# Patient Record
Sex: MALE | Race: ASIAN | NOT HISPANIC OR LATINO | URBAN - METROPOLITAN AREA
[De-identification: names, ages, dates, MRNs, and addresses within clinical notes are randomized per-mention and may not be internally consistent; named-entity substitution may affect disease eponyms.]

---

## 2017-06-16 ENCOUNTER — OFFICE VISIT - HEALTHEAST (OUTPATIENT)
Dept: FAMILY MEDICINE | Facility: CLINIC | Age: 22
End: 2017-06-16

## 2017-06-16 DIAGNOSIS — Z28.39 IMMUNIZATION DEFICIENCY: ICD-10-CM

## 2017-06-16 DIAGNOSIS — Z11.3 SCREENING FOR STD (SEXUALLY TRANSMITTED DISEASE): ICD-10-CM

## 2017-06-16 DIAGNOSIS — R21 RASH: ICD-10-CM

## 2017-06-16 DIAGNOSIS — L29.9 PRURITUS: ICD-10-CM

## 2017-06-16 ASSESSMENT — MIFFLIN-ST. JEOR: SCORE: 1553.35

## 2017-06-19 LAB — SYPHILIS RPR SCREEN - HISTORICAL: NORMAL

## 2017-07-19 ENCOUNTER — OFFICE VISIT - HEALTHEAST (OUTPATIENT)
Dept: FAMILY MEDICINE | Facility: CLINIC | Age: 22
End: 2017-07-19

## 2017-07-19 DIAGNOSIS — H53.8 BLURRED VISION, BILATERAL: ICD-10-CM

## 2017-07-19 DIAGNOSIS — L29.9 PRURITUS: ICD-10-CM

## 2017-07-19 DIAGNOSIS — R21 RASH: ICD-10-CM

## 2017-07-19 DIAGNOSIS — Z23 IMMUNIZATION DUE: ICD-10-CM

## 2017-07-19 ASSESSMENT — MIFFLIN-ST. JEOR: SCORE: 1535.21

## 2017-08-30 ENCOUNTER — OFFICE VISIT - HEALTHEAST (OUTPATIENT)
Dept: FAMILY MEDICINE | Facility: CLINIC | Age: 22
End: 2017-08-30

## 2017-08-30 DIAGNOSIS — Z91.018 FOOD ALLERGY: ICD-10-CM

## 2017-08-30 DIAGNOSIS — Z00.00 ROUTINE GENERAL MEDICAL EXAMINATION AT A HEALTH CARE FACILITY: ICD-10-CM

## 2017-08-30 DIAGNOSIS — L29.9 PRURITUS: ICD-10-CM

## 2017-08-30 RX ORDER — LORATADINE 10 MG/1
10 TABLET ORAL DAILY
Qty: 30 TABLET | Refills: 11 | Status: SHIPPED | OUTPATIENT
Start: 2017-08-30

## 2017-08-30 ASSESSMENT — MIFFLIN-ST. JEOR: SCORE: 1527.88

## 2017-08-31 ENCOUNTER — COMMUNICATION - HEALTHEAST (OUTPATIENT)
Dept: FAMILY MEDICINE | Facility: CLINIC | Age: 22
End: 2017-08-31

## 2017-08-31 ENCOUNTER — AMBULATORY - HEALTHEAST (OUTPATIENT)
Dept: FAMILY MEDICINE | Facility: CLINIC | Age: 22
End: 2017-08-31

## 2017-09-05 ENCOUNTER — OFFICE VISIT - HEALTHEAST (OUTPATIENT)
Dept: ALLERGY | Facility: CLINIC | Age: 22
End: 2017-09-05

## 2017-09-05 DIAGNOSIS — Z91.018 FOOD ALLERGY: ICD-10-CM

## 2017-09-05 DIAGNOSIS — L30.9 ECZEMA: ICD-10-CM

## 2017-09-05 RX ORDER — TRIAMCINOLONE ACETONIDE 1 MG/G
OINTMENT TOPICAL 2 TIMES DAILY
Qty: 453.6 G | Refills: 1 | Status: SHIPPED | OUTPATIENT
Start: 2017-09-05

## 2017-09-05 ASSESSMENT — MIFFLIN-ST. JEOR: SCORE: 1535.21

## 2017-09-08 LAB — TOTAL IGE - HISTORICAL: 727 KU/L (ref 0–100)

## 2017-09-12 ENCOUNTER — COMMUNICATION - HEALTHEAST (OUTPATIENT)
Dept: ALLERGY | Facility: CLINIC | Age: 22
End: 2017-09-12

## 2017-09-26 ENCOUNTER — OFFICE VISIT - HEALTHEAST (OUTPATIENT)
Dept: ALLERGY | Facility: CLINIC | Age: 22
End: 2017-09-26

## 2019-02-28 ENCOUNTER — COMMUNICATION - HEALTHEAST (OUTPATIENT)
Dept: SCHEDULING | Facility: CLINIC | Age: 24
End: 2019-02-28

## 2019-02-28 ENCOUNTER — OFFICE VISIT - HEALTHEAST (OUTPATIENT)
Dept: FAMILY MEDICINE | Facility: CLINIC | Age: 24
End: 2019-02-28

## 2019-02-28 DIAGNOSIS — K40.90 UNILATERAL INGUINAL HERNIA WITHOUT OBSTRUCTION OR GANGRENE, RECURRENCE NOT SPECIFIED: ICD-10-CM

## 2019-02-28 ASSESSMENT — MIFFLIN-ST. JEOR: SCORE: 1547.91

## 2019-03-04 ENCOUNTER — OFFICE VISIT - HEALTHEAST (OUTPATIENT)
Dept: SURGERY | Facility: CLINIC | Age: 24
End: 2019-03-04

## 2019-03-04 DIAGNOSIS — K40.90 NON-RECURRENT UNILATERAL INGUINAL HERNIA WITHOUT OBSTRUCTION OR GANGRENE: ICD-10-CM

## 2019-03-04 ASSESSMENT — MIFFLIN-ST. JEOR: SCORE: 1547

## 2019-03-07 ENCOUNTER — COMMUNICATION - HEALTHEAST (OUTPATIENT)
Dept: SURGERY | Facility: CLINIC | Age: 24
End: 2019-03-07

## 2019-03-26 ENCOUNTER — ANESTHESIA - HEALTHEAST (OUTPATIENT)
Dept: SURGERY | Facility: AMBULATORY SURGERY CENTER | Age: 24
End: 2019-03-26

## 2019-03-26 ENCOUNTER — OFFICE VISIT - HEALTHEAST (OUTPATIENT)
Dept: FAMILY MEDICINE | Facility: CLINIC | Age: 24
End: 2019-03-26

## 2019-03-26 DIAGNOSIS — Z01.818 PREOPERATIVE EXAMINATION: ICD-10-CM

## 2019-03-26 DIAGNOSIS — K40.91 UNILATERAL RECURRENT INGUINAL HERNIA WITHOUT OBSTRUCTION OR GANGRENE: ICD-10-CM

## 2019-03-26 DIAGNOSIS — L29.9 PRURITUS: ICD-10-CM

## 2019-03-26 LAB
ANION GAP SERPL CALCULATED.3IONS-SCNC: 11 MMOL/L (ref 5–18)
BUN SERPL-MCNC: 7 MG/DL (ref 8–22)
CALCIUM SERPL-MCNC: 9.8 MG/DL (ref 8.5–10.5)
CHLORIDE BLD-SCNC: 105 MMOL/L (ref 98–107)
CO2 SERPL-SCNC: 26 MMOL/L (ref 22–31)
CREAT SERPL-MCNC: 0.71 MG/DL (ref 0.7–1.3)
GFR SERPL CREATININE-BSD FRML MDRD: >60 ML/MIN/1.73M2
GLUCOSE BLD-MCNC: 100 MG/DL (ref 70–125)
HGB BLD-MCNC: 14.6 G/DL (ref 14–18)
POTASSIUM BLD-SCNC: 3.8 MMOL/L (ref 3.5–5)
SODIUM SERPL-SCNC: 142 MMOL/L (ref 136–145)

## 2019-03-26 ASSESSMENT — MIFFLIN-ST. JEOR: SCORE: 1570.93

## 2019-03-27 ENCOUNTER — SURGERY - HEALTHEAST (OUTPATIENT)
Dept: SURGERY | Facility: AMBULATORY SURGERY CENTER | Age: 24
End: 2019-03-27

## 2019-03-27 ENCOUNTER — HOSPITAL ENCOUNTER (OUTPATIENT)
Dept: SURGERY | Facility: AMBULATORY SURGERY CENTER | Age: 24
Discharge: HOME | End: 2019-03-27
Attending: SURGERY | Admitting: SURGERY

## 2019-03-27 DIAGNOSIS — K40.91 UNILATERAL RECURRENT INGUINAL HERNIA WITHOUT OBSTRUCTION OR GANGRENE: ICD-10-CM

## 2019-03-27 RX ORDER — TRAMADOL HYDROCHLORIDE 50 MG/1
50 TABLET ORAL EVERY 6 HOURS PRN
Qty: 12 TABLET | Refills: 0 | Status: SHIPPED | OUTPATIENT
Start: 2019-03-27

## 2019-03-27 ASSESSMENT — MIFFLIN-ST. JEOR
SCORE: 1570.93
SCORE: 1570.93

## 2019-04-09 ENCOUNTER — OFFICE VISIT - HEALTHEAST (OUTPATIENT)
Dept: SURGERY | Facility: CLINIC | Age: 24
End: 2019-04-09

## 2019-04-09 DIAGNOSIS — Z98.890 POST-OPERATIVE STATE: ICD-10-CM

## 2021-05-27 NOTE — OP NOTE
Name:  Maynor Jett  PCP:  Juan Aguero MD  Procedure Date:  3/27/2019      REPAIR, HERNIA, INGUINAL, LAPAROSCOPIC (Left)    Pre-Procedure Diagnosis:  Inguinal hernia [K40.90]     Post-Procedure Diagnosis:    Inguinal hernia [K40.90]    Surgeon(s):  Triston Carrizales MD    Anesthesia Type:  GET      Findings:  Left indirect inguinal hernia    Operative Report:    The patient was brought to the operating room where after induction of general anesthesia with endotracheal intubation was positioned with both arms tucked.  Was then prepped and draped in standard sterile fashion    After procedural pause is performed we began by making a curvilinear incision inferior to the umbilicus after injection of local anesthetic.  Using blunt dissection with cautery dissected down to the anterior abdominal wall fascia.  The linea alba was identified.  We then made a transverse incision in the anterior fascia of the rectus sheath just to the right of the linea alba.  The rectus muscle was then bluntly swept laterally and the preperitoneal space bluntly dissected free with a finger.  A 10 mm trocar was then inserted and the preperitoneal space was insufflated.  Two 5 mm trochars and placed the first immediately superior to the pubis and the second 5 cm further cephalad.    We began our dissection on the left side.  Using blunt dissection the peritoneum was dissected away from the anterior abdominal wall.  This dissection was carried lateral until we were cephalad and lateral to the anterior superior iliac spine.  Proceeding distal to the internal inguinal ring the hernia sac was identified.  Using blunt dissection the hernia sac was dissected free from the vas deferens and spermatic vessels.  1 tears were made in the peritoneum which was closed with a clip.  The peritoneum was dissected further posteriorly until we were able to visualize the lateral aspect of the psoas muscle, and dissected medially until we were able to visualize  Ramon's ligament.     A piece of 12 x 15 cm Prolene mesh was then selected.  It was then folded and inserted into the preperitoneal space through the 10 mm port.  Once in the preperitoneal space was unfurled against the anterior abdominal wall well centered over the hernia defect.  There were no wrinkles or concerns for mesh folding.  The medial aspect of the mesh overlay Ramon's ligament.     Satisfied with our mesh mesh placement and then proceeded to desufflate the preperitoneal space under direct visualization.  The ports and incisions were then removed.  The remainder of the local anesthetic was then injected in the skin of the fashion described in the port sites.  The fascial defect was closed with a figure-of-eight interrupted 0 Vicryl suture and the skin closed with running 4-0 Vicryl subcuticular suture.        Estimated Blood Loss:   * No blood loss documented between In Room and Out of Room log events - 3/27/2019 11:33 AM to 3/27/2019 12:21 PM *    Specimens:    None       Complications:    None    Triston Carrizales

## 2021-05-27 NOTE — ANESTHESIA PREPROCEDURE EVALUATION
Anesthesia Evaluation      Patient summary reviewed   No history of anesthetic complications     Airway   Mallampati: I  Neck ROM: full   Pulmonary - negative ROS and normal exam                          Cardiovascular - negative ROS and normal exam   Neuro/Psych - negative ROS     Endo/Other - negative ROS      GI/Hepatic/Renal - negative ROS           Dental - normal exam                        Anesthesia Plan  Planned anesthetic: general endotracheal    ASA 1   Induction: intravenous   Anesthetic plan and risks discussed with: patient, spouse and  services used  Anesthesia plan special considerations: antiemetics,   Post-op plan: routine recovery      Results for orders placed or performed in visit on 03/26/19   Basic Metabolic Panel   Result Value Ref Range    Sodium 142 136 - 145 mmol/L    Potassium 3.8 3.5 - 5.0 mmol/L    Chloride 105 98 - 107 mmol/L    CO2 26 22 - 31 mmol/L    Anion Gap, Calculation 11 5 - 18 mmol/L    Glucose 100 70 - 125 mg/dL    Calcium 9.8 8.5 - 10.5 mg/dL    BUN 7 (L) 8 - 22 mg/dL    Creatinine 0.71 0.70 - 1.30 mg/dL    GFR MDRD Af Amer >60 >60 mL/min/1.73m2    GFR MDRD Non Af Amer >60 >60 mL/min/1.73m2   Hemoglobin   Result Value Ref Range    Hemoglobin 14.6 14.0 - 18.0 g/dL

## 2021-05-27 NOTE — ANESTHESIA CARE TRANSFER NOTE
Last vitals:   Vitals:    03/27/19 1224   BP: 116/59   Pulse: 88   Resp: 16   Temp: 36.9  C (98.4  F)   SpO2: 100%     Patient spontaneous RR, TV 400s, suctioned, following commands, extubated to facemask 10LPM, O2 sats 100%. VSS. Report to RN.    Patient's level of consciousness is drowsy  Spontaneous respirations: yes  Maintains airway independently: yes  Dentition unchanged: yes  Oropharynx: oropharynx clear of all foreign objects    QCDR Measures:  ASA# 20 - Surgical Safety Checklist: WHO surgical safety checklist completed prior to induction    PQRS# 430 - Adult PONV Prevention: 4558F - Pt received => 2 anti-emetic agents (different classes) preop & intraop  ASA# 8 - Peds PONV Prevention: NA - Not pediatric patient, not GA or 2 or more risk factors NOT present  PQRS# 424 - Rosalba-op Temp Management: 4559F - At least one body temp DOCUMENTED => 35.5C or 95.9F within required timeframe  PQRS# 426 - PACU Transfer Protocol: - Transfer of care checklist used  ASA# 14 - Acute Post-op Pain: ASA14B - Patient did NOT experience pain >= 7 out of 10

## 2021-05-27 NOTE — ANESTHESIA POSTPROCEDURE EVALUATION
Patient: [facility] Jett  REPAIR, HERNIA, INGUINAL, LAPAROSCOPIC  Anesthesia type: general    Patient location: Phase II Recovery  Last vitals:   Vitals:    03/27/19 1300   BP: 117/66   Pulse: 81   Resp: 16   Temp: 37.1  C (98.8  F)   SpO2: 100%     Post vital signs: stable  Level of consciousness: awake and responds to simple questions  Post-anesthesia pain: pain controlled  Post-anesthesia nausea and vomiting: no  Pulmonary: unassisted, return to baseline  Cardiovascular: stable and blood pressure at baseline  Hydration: adequate  Anesthetic events: no    QCDR Measures:  ASA# 11 - Rosalba-op Cardiac Arrest: ASA11B - Patient did NOT experience unanticipated cardiac arrest  ASA# 12 - [place]alba-op Mortality Rate: ASA12B - Patient did NOT die  ASA# 13 - PACU Re-Intubation Rate: ASA13B - Patient did NOT require a new airway mgmt  ASA# 10 - Composite Anes Safety: ASA10A - No serious adverse event    Additional Notes:

## 2021-05-27 NOTE — PROGRESS NOTES
Preoperative Exam     Scheduled Procedure: Hernia  Surgery Date:  03/27/2019  Surgery Location: [facility], fax [phone]    Surgeon:  Dr. Carrizales    Assessment/Plan:     1. Preoperative examination  - Basic Metabolic Panel  - Hemoglobin    2. Unilateral recurrent inguinal hernia without obstruction or gangrene  Surgery on 3/27/19    3. Pruritus  Improved.     Have you had prior anesthesia?: No  Have you or any family members had a previous anesthesia reaction:  Unknown  Do you or any family members have a history of a clotting or bleeding disorder?: No    Patient approved for surgery with general or local anesthesia.    Please Note:    Functional Status: Independent  Patient plans to recover at home with family.     Subjective:      Maynor Frausto is a 24 y.o. male who presents for a preoperative consultation.    Has been having left inguinal pain and swelling for 2-3 years, progressively worsened in the last year.  Increased swelling and pain when he stands up for long period of time. He was seen by one of my partners here for this and was referred to surgery for left inguinal hernia.    No prior major surgery.  No known bleeding disorder.  No known family history of anesthesia  He is not taking any daily medications.  No fever or URI symptoms in the past 2 weeks.      All other systems reviewed and are negative, other than those listed in the HPI.    Pertinent History  Do you have difficulty breathing or chest pain after walking up a flight of stairs: No  History of obstructive sleep apnea: No  Steroid use in the last 6 months: No  Frequent Aspirin/NSAID use: No  Prior Blood Transfusion: No  Prior Blood Transfusion Reaction: No  If for some reason prior to, during or after the procedure, if it is medically indicated, would you be willing to have a blood transfusion?:  There is no transfusion refusal.    Current Outpatient Medications   Medication Sig Dispense Refill     loratadine (CLARITIN) 10 mg  tablet Take 1 tablet (10 mg total) by mouth daily. 30 tablet 11     triamcinolone (KENALOG) 0.1 % ointment Apply topically 2 (two) times a day. 453.6 g 1     No current facility-administered medications for this visit.         Allergies   Allergen Reactions     Penicillins Hives, Itching and Swelling     Fish Containing Products Itching     Shrimp Swelling and Rash       Patient Active Problem List   Diagnosis     Food allergy     Pruritus     Inguinal hernia       No past medical history on file.    No past surgical history on file.    Social History     Socioeconomic History     Marital status: Single     Spouse name: Not on file     Number of children: Not on file     Years of education: Not on file     Highest education level: Not on file   Occupational History     Not on file   Social Needs     Financial resource strain: Not on file     Food insecurity:     Worry: Not on file     Inability: Not on file     Transportation needs:     Medical: Not on file     Non-medical: Not on file   Tobacco Use     Smoking status: Never Smoker     Smokeless tobacco: Never Used   Substance and Sexual Activity     Alcohol use: No     Frequency: Never     Drug use: No     Sexual activity: Not on file   Lifestyle     Physical activity:     Days per week: Not on file     Minutes per session: Not on file     Stress: Not on file   Relationships     Social connections:     Talks on phone: Not on file     Gets together: Not on file     Attends Advent service: Not on file     Active member of club or organization: Not on file     Attends meetings of clubs or organizations: Not on file     Relationship status: Not on file     Intimate partner violence:     Fear of current or ex partner: Not on file     Emotionally abused: Not on file     Physically abused: Not on file     Forced sexual activity: Not on file   Other Topics Concern     Not on file   Social History Narrative     Not on file         Objective:     Vitals:    03/26/19 0801  "  BP: 120/58   Pulse: 93   Resp: 16   Temp: 97.6  F (36.4  C)   TempSrc: Oral   SpO2: 99%   Weight: 137 lb (62.1 kg)   Height: 5' 8\" (1.727 m)         Physical Exam:  Gen - alert, orientated, NAD  Eyes - fundascopic exam limited by the undialated pupil but looks symmetric  ENT - oropharynx clear, TMs clear  Neck - supple, no palpable mass or lymphadenopathy  CV - RRR, no murmur  Resp - lungs CTA  Ab - soft, nontender, no palpable mass or organomegaly  LEFT INGUINAL- No bruising or discoloration.  Reducible left inguinal hernia noted.  Extrem - warm, no edema  Neuro - CN II-XII intact, strength, sensation, reflexes intact and symmetric  Skin - no rash, no atypical appearing lesions seen.     There are no Patient Instructions on file for this visit.    Labs:  Recent Results (from the past 48 hour(s))   Hemoglobin    Collection Time: 03/26/19  8:26 AM   Result Value Ref Range    Hemoglobin 14.6 14.0 - 18.0 g/dL        Immunization History   Administered Date(s) Administered     Hep A, historic 07/21/2010, 02/12/2011     Hep B, historic 02/19/2010, 07/21/2010, 02/12/2011     IPV 07/21/2010, 02/12/2011, 08/13/2011     Influenza, inj, historic,unspecified 02/12/2011     MMR 02/19/2010, 07/21/2010     Meningococcal MCV4 Conjugate,Unspecified 02/21/2010     Td,adult,historic,unspecified 02/19/2010, 02/12/2011     Tdap 02/12/2011, 07/19/2017           Electronically signed by Juan Aguero MD 03/26/19 8:04 AM  "

## 2021-05-27 NOTE — PROGRESS NOTES
HPI: Pt is here for follow up of a inguinal hernia repair.   he is doing well.  Pain is well controlled.  No difficulties with the surgical wound/wounds.  he is eating well and denies fever and chills.         /62 (Patient Site: Right Arm, Patient Position: Sitting, Cuff Size: Adult Regular)   Pulse 82   SpO2 97%     EXAM:  GENERAL:Appears well  ABDOMEN:  Soft, +BS  SURGICAL WOUNDS:  Incisions healing well, no enduration or drainage.      Assessment/Plan: . Doing well after surgery and should follow up as needed.      Justyn Weber, [facility] Department of Surgery

## 2021-05-27 NOTE — INTERVAL H&P NOTE
I have performed an assessment and examined the patient, as necessary, to update the patient's current status that may have changed since the prior History and Physical.  The History & Physical has been reviewed and the patient's status is unchanged.     Triston Carrizales MD  [phone]  [facility] Department of Surgery

## 2021-05-27 NOTE — H&P (VIEW-ONLY)
Preoperative Exam     Scheduled Procedure: Hernia  Surgery Date:  03/27/2019  Surgery Location: [facility], fax [phone]    Surgeon:  Dr. Carrizales    Assessment/Plan:     1. Preoperative examination  - Basic Metabolic Panel  - Hemoglobin    2. Unilateral recurrent inguinal hernia without obstruction or gangrene  Surgery on 3/27/19    3. Pruritus  Improved.     Have you had prior anesthesia?: No  Have you or any family members had a previous anesthesia reaction:  Unknown  Do you or any family members have a history of a clotting or bleeding disorder?: No    Patient approved for surgery with general or local anesthesia.    Please Note:    Functional Status: Independent  Patient plans to recover at home with family.     Subjective:      Maynor Frausto is a 24 y.o. male who presents for a preoperative consultation.    Has been having left inguinal pain and swelling for 2-3 years, progressively worsened in the last year.  Increased swelling and pain when he stands up for long period of time. He was seen by one of my partners here for this and was referred to surgery for left inguinal hernia.    No prior major surgery.  No known bleeding disorder.  No known family history of anesthesia  He is not taking any daily medications.  No fever or URI symptoms in the past 2 weeks.      All other systems reviewed and are negative, other than those listed in the HPI.    Pertinent History  Do you have difficulty breathing or chest pain after walking up a flight of stairs: No  History of obstructive sleep apnea: No  Steroid use in the last 6 months: No  Frequent Aspirin/NSAID use: No  Prior Blood Transfusion: No  Prior Blood Transfusion Reaction: No  If for some reason prior to, during or after the procedure, if it is medically indicated, would you be willing to have a blood transfusion?:  There is no transfusion refusal.    Current Outpatient Medications   Medication Sig Dispense Refill     loratadine (CLARITIN) 10 mg  tablet Take 1 tablet (10 mg total) by mouth daily. 30 tablet 11     triamcinolone (KENALOG) 0.1 % ointment Apply topically 2 (two) times a day. 453.6 g 1     No current facility-administered medications for this visit.         Allergies   Allergen Reactions     Penicillins Hives, Itching and Swelling     Fish Containing Products Itching     Shrimp Swelling and Rash       Patient Active Problem List   Diagnosis     Food allergy     Pruritus     Inguinal hernia       No past medical history on file.    No past surgical history on file.    Social History     Socioeconomic History     Marital status: Single     Spouse name: Not on file     Number of children: Not on file     Years of education: Not on file     Highest education level: Not on file   Occupational History     Not on file   Social Needs     Financial resource strain: Not on file     Food insecurity:     Worry: Not on file     Inability: Not on file     Transportation needs:     Medical: Not on file     Non-medical: Not on file   Tobacco Use     Smoking status: Never Smoker     Smokeless tobacco: Never Used   Substance and Sexual Activity     Alcohol use: No     Frequency: Never     Drug use: No     Sexual activity: Not on file   Lifestyle     Physical activity:     Days per week: Not on file     Minutes per session: Not on file     Stress: Not on file   Relationships     Social connections:     Talks on phone: Not on file     Gets together: Not on file     Attends Sabianism service: Not on file     Active member of club or organization: Not on file     Attends meetings of clubs or organizations: Not on file     Relationship status: Not on file     Intimate partner violence:     Fear of current or ex partner: Not on file     Emotionally abused: Not on file     Physically abused: Not on file     Forced sexual activity: Not on file   Other Topics Concern     Not on file   Social History Narrative     Not on file         Objective:     Vitals:    03/26/19 0801  "  BP: 120/58   Pulse: 93   Resp: 16   Temp: 97.6  F (36.4  C)   TempSrc: Oral   SpO2: 99%   Weight: 137 lb (62.1 kg)   Height: 5' 8\" (1.727 m)         Physical Exam:  Gen - alert, orientated, NAD  Eyes - fundascopic exam limited by the undialated pupil but looks symmetric  ENT - oropharynx clear, TMs clear  Neck - supple, no palpable mass or lymphadenopathy  CV - RRR, no murmur  Resp - lungs CTA  Ab - soft, nontender, no palpable mass or organomegaly  LEFT INGUINAL- No bruising or discoloration.  Reducible left inguinal hernia noted.  Extrem - warm, no edema  Neuro - CN II-XII intact, strength, sensation, reflexes intact and symmetric  Skin - no rash, no atypical appearing lesions seen.     There are no Patient Instructions on file for this visit.    Labs:  Recent Results (from the past 48 hour(s))   Hemoglobin    Collection Time: 03/26/19  8:26 AM   Result Value Ref Range    Hemoglobin 14.6 14.0 - 18.0 g/dL        Immunization History   Administered Date(s) Administered     Hep A, historic 07/21/2010, 02/12/2011     Hep B, historic 02/19/2010, 07/21/2010, 02/12/2011     IPV 07/21/2010, 02/12/2011, 08/13/2011     Influenza, inj, historic,unspecified 02/12/2011     MMR 02/19/2010, 07/21/2010     Meningococcal MCV4 Conjugate,Unspecified 02/21/2010     Td,adult,historic,unspecified 02/19/2010, 02/12/2011     Tdap 02/12/2011, 07/19/2017           Electronically signed by Juan Aguero MD 03/26/19 8:04 AM  "

## 2021-05-31 VITALS — HEIGHT: 68 IN | BODY MASS INDEX: 19.48 KG/M2 | WEIGHT: 128.5 LBS

## 2021-05-31 VITALS — BODY MASS INDEX: 19.7 KG/M2 | WEIGHT: 130 LBS | HEIGHT: 68 IN

## 2021-05-31 VITALS — WEIGHT: 134 LBS | BODY MASS INDEX: 20.31 KG/M2 | HEIGHT: 68 IN

## 2021-05-31 VITALS — HEIGHT: 68 IN | WEIGHT: 130 LBS | BODY MASS INDEX: 19.7 KG/M2

## 2021-06-02 VITALS — HEIGHT: 68 IN | BODY MASS INDEX: 20.1 KG/M2 | WEIGHT: 132.6 LBS

## 2021-06-02 VITALS
WEIGHT: 137 LBS | BODY MASS INDEX: 20.76 KG/M2 | HEIGHT: 68 IN | WEIGHT: 137 LBS | HEIGHT: 68 IN | BODY MASS INDEX: 20.76 KG/M2

## 2021-06-02 VITALS — BODY MASS INDEX: 20.76 KG/M2 | HEIGHT: 68 IN | WEIGHT: 137 LBS

## 2021-06-02 VITALS — WEIGHT: 132.8 LBS | BODY MASS INDEX: 20.13 KG/M2 | HEIGHT: 68 IN

## 2021-06-11 NOTE — PROGRESS NOTES
"ASSESMENT AND PLAN:  Diagnoses and all orders for this visit:    Rash  Resolved    Pruritus  Resolved  His Claritin as needed.    Blurred vision, bilateral  -     Ambulatory referral to Optometry    Immunization due  -     Tdap vaccine,  8yo or older,  IM    He will make appointment for physical and bring all his records from [place].    SUBJECTIVE: Warren Frausto is a 22-year-old male here to follow-up on rash and pruritus.  He was seen about a month ago for this.  CBC was normal.  He was given Claritin to take as needed.  States the rash has resolved.  She has not been eating Claritin for the past few weeks.  No shortness of breath or wheezing.  No known allergy to medications or environment.    He wears glasses for more than 3 years now.  Last eye exam was about 2 years ago, wanting to see an eye doctor, needs referral..  No acute eye pain.  No floaters.  No discharge from the eyes.    He first arrived to [facility] from [place], immunizations and initial refugee exams in [place].  He then moved to [place], left there for 2 years.  He did not go to any clinics while in [place].  States he received all his immunizations in [place], has records at home.    No past medical history on file.  There is no problem list on file for this patient.      Allergies:    Allergies   Allergen Reactions     Shrimp Swelling and Rash     Fish Containing Products      Penicillins Hives, Itching and Swelling       History   Smoking Status     Never Smoker   Smokeless Tobacco     Never Used       Review of systems otherwise negative except as listed in HPI.   History   Smoking Status     Never Smoker   Smokeless Tobacco     Never Used       OBJECTICE: BP 98/66 (Patient Site: Left Arm, Patient Position: Sitting, Cuff Size: Adult Regular)  Pulse 84  Temp 98.2  F (36.8  C) (Oral)   Resp 16  Ht 5' 7.75\" (1.721 m)  Wt 130 lb (59 kg)  BMI 19.91 kg/m2          GEN-alert,  in no apparent distress.  HEENT-mucous " membranes are moist, neck is supple.  CV-regular rate and rhythm with no murmur.   RESP-lungs clear to auscultation .  ABDOMEN- Soft , not tender.  SKIN-no rash.Mild acne on face.         Juan Aguero   7/19/2017   This transcription uses voice recognition software, which may contain typographical errors.

## 2021-06-11 NOTE — PROGRESS NOTES
"ASSESMENT AND PLAN:  Diagnoses and all orders for this visit:    Rash  Continue Eucerin cream.  CBC and hepatic profile.  Claritin also for pruritus.  He will follow-up in 4 weeks.      Pruritus  -     loratadine (CLARITIN) 10 mg tablet; Take 1 tablet (10 mg total) by mouth daily.  Dispense: 30 tablet; Refill: 1      Immunization deficiency  Patient will bring immunization records at next visit.  Moved to [place] 2 months ago.      SUBJECTIVE: Warren Frausto is here with rashes on upper and lower extremities on and off for 2 months.  Associated with significant itchiness.  The symptoms usually worse after taking a bath or shower with hot water.  Shortness of breath or wheezing associated with it.  No known history of allergy.  No other family members with similar problems.  Does not seem to be aggravated by food.  He has been using Eucerin cream, some improvement with that.      Allergies:    Allergies   Allergen Reactions     Fish Containing Products      Penicillins Hives, Itching and Swelling       History   Smoking Status     Never Smoker   Smokeless Tobacco     Never Used       Review of systems otherwise negative except as listed in HPI.   History   Smoking Status     Never Smoker   Smokeless Tobacco     Never Used       OBJECTICE: /64  Pulse 77  Temp 98.3  F (36.8  C) (Oral)   Resp 20  Ht 5' 7.75\" (1.721 m)  Wt 134 lb (60.8 kg)  SpO2 96%  BMI 20.53 kg/m2    DATA REVIEWED:    Labs Reviewed or Ordered (1):       GEN-alert,  in no apparent distress.  HEENT-mucous membranes are moist, neck is supple.  CV-regular rate and rhythm with no murmur.   RESP-lungs clear to auscultation .  ABDOMEN- Soft , not tender.  SKIN-multiple small hypo-and hyperpigmented rashes on bilateral upper extremities, lower back and lower extremities.  Some excoriation noted from scratching.  Sizes 1-2 mm in diameter.  No signs of secondary infection.        Juan Aguero   6/16/2017   This transcription uses voice recognition " software, which may contain typographical errors.

## 2021-06-12 NOTE — PROGRESS NOTES
Assessment:     Eczema  Possible food allergy    Plan:  Food allergy testing including seafood panel, beef, chicken and egg  Environmental allergy testing  Triamcinilone 0.1% cream applied twice daily to affected skin areas.  Return in 1 month.  We will review therapy and allergy testing.    ____________________________________________________________________________     Warren comes in today for evaluation of an itchy rash.  It started this past winter I develop itchiness on his arms and legs.  He does develop visible rash with scratching it.  He identifies foods such as seafood, egg, chicken, beef, fish that can trigger symptoms.  He describes a raised itchy rash with these foods.  He also gets itchy symptoms with cold weather and rainy weather.  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  He does wear gloves and plastic over his arms.  He does feel that this may trigger symptoms.  He uses Allegra 180 mg daily and has used topical Eucerin cream.  He reports symptoms of nasal congestion but no sneezing or rhinorrhea.    Review of symptoms:  As above, otherwise negative    Past medical history: No other chronic medical conditions noted.    Allergies: No known allergies to medications, latex,  or hymenoptera venom    Family history: No known member of the family with allergy or asthma.    Social history: Currently has lived in the same apartment for 6 months.  It has forced air heat.  No pets in the home.  No cigarette smoking history.    Medications: Claritin    Physical Exam:  General:  Alert and Oriented X 3.  Eyes:  Sclera clear.  Ears: TMs translucent grey with bony landmarks visible. Nose: Pale, boggy mucosal membranes.  Throat: Pink, moist.  No lesions.  Neck: Supple.  No lymphadenopathy.  Lungs: CTA.  CV: Regular rate and rhythm. Extremities: Well perfused.  No clubbing or cyanosis. Skin: Eczematous rash bilateral upper extremities.  This is concentrated in the axilla and the inside  aspect of both arms.    This transcription uses voice recognition software, which may contain typographical errors.

## 2021-06-12 NOTE — PROGRESS NOTES
Assessment:Plan     1. Routine general medical examination at a health care facility    Declined STD screening  tests.    2. Pruritus  - loratadine (CLARITIN) 10 mg tablet; Take 1 tablet (10 mg total) by mouth daily.  Dispense: 30 tablet; Refill: 11    3. Food allergy  Reported allergy to egg, skin, shrimp, fish, beef and goat.  No known nuts allergy.  - Ambulatory referral to Allergy    Subjective:      Maynor Frausto is a 22 y.o. male who presents for an annual exam. The patient reports that there is not domestic violence in his life.   Sexually active.  No known history of STDs.  Has 1 child.  Reported rash all over the body including upper extremities, lower extremities, chest, back and abdomen after eating eggs, chicken, shrimp, fish, beef and goat . Worse symptoms with shrimp.  The symptom started about 4 months ago.  No shortness of breath, lip swelling or difficulty swallowing with rash.  She also reported rash after exposure to cold air in the winter months.  No rash with milk but reported diarrhea every time he drinks milk.  No allergy to Nuts including peanuts. No history of chronic watery itchy eyes.  Claritin helps with the symptoms.  Known allergy to medications.    Healthy Habits:     Regular Exercise: Yes  Sunscreen Use: No  Healthy Diet: Yes  Dental Visits Regularly: No and arrive 7 months ago from [place]  Seat Belt: Yes  Sexually active: Yes  Monthly Self Testicular Exams:  No  Hemoccults: N/A  Flex Sig: N/A  Colonoscopy: N/A  Lipid Profile: Yes  Glucose Screen: Yes  Prevention of Osteoporosis: N/A  Last Dexa: N/A  Guns at Home:  No      Immunization History   Administered Date(s) Administered     Hep A, historic 07/21/2010, 02/12/2011     Hep B, historic 02/19/2010, 07/21/2010, 02/12/2011     IPV 07/21/2010, 02/12/2011, 08/13/2011     Influenza, inj, historic 02/12/2011     MMR 02/19/2010, 07/21/2010     Meningococcal Conjugate 02/21/2010     Td, historic 02/19/2010, 02/12/2011     Tdap  "02/12/2011, 07/19/2017     Immunization status: due today, Influenza .    No exam data present     Current Outpatient Prescriptions   Medication Sig Dispense Refill     loratadine (CLARITIN) 10 mg tablet Take 1 tablet (10 mg total) by mouth daily. 30 tablet 1     No current facility-administered medications for this visit.      No past medical history on file.  No past surgical history on file.  Shrimp; Fish containing products; and Penicillins  No family history on file.  Social History     Social History     Marital status: Single     Spouse name: N/A     Number of children: N/A     Years of education: N/A     Occupational History     Not on file.     Social History Main Topics     Smoking status: Never Smoker     Smokeless tobacco: Never Used     Alcohol use Not on file     Drug use: Not on file     Sexual activity: Not on file     Other Topics Concern     Not on file     Social History Narrative       Review of Systems  Review of Systems    No acute fever or URI symptoms.  Pruritic rashes on arm, bilateral.  No SOB or wheezing.       Objective:     Vitals:    08/30/17 1448   BP: 106/64   Pulse: 64   Resp: 16   Temp: 98.3  F (36.8  C)   TempSrc: Oral   Weight: 128 lb 8 oz (58.3 kg)   Height: 5' 7.72\" (1.72 m)     Body mass index is 19.7 kg/(m^2).    Physical  Physical Exam    Gen - alert, orientated, NAD  Eyes - fundascopic exam limited by the undialated pupil but looks symmetric  ENT - oropharynx clear, TMs clear  Neck - supple, no palpable mass or lymphadenopathy  CV - RRR, no murmur  Resp - lungs CTA, no wheezing.  Ab - soft, nontender, no palpable mass or organomegaly   - normal appearance to the external genetalia, normal testicular exam bilaterally, no hernia  Extrem - warm, no edema  Neuro - CN II-XII intact, strength, sensation, reflexes intact and symmetric  Skin - Maculopapular rashes on bilateral upper ext, upper back and upper chest.  Some excoriation on the left axilla        "

## 2021-06-13 NOTE — PROGRESS NOTES
Assessment:      Eczema.  Improved with regular use of topical steroid.  Recent allergy testing for foods does not suggest IgE mediated food allergy.  Elevated total IgE is consistent with eczema.  Low positive testing for shellfish does not reflect true allergy.  These are falsely positive secondary to the significantly elevated total IgE.     Plan:  Reviewed skin care and regular moisturizing.  Triamcinilone 0.1% cream applied twice daily to affected skin areas.  Follow-up as needed.  ____________________________________________________________________________     Warren comes in today for follow-up.  Since last seen he has been using triamcinolone 0.1% topically daily.  He reports that this is helped significantly.  We did discuss food allergy at the last visit.  He did feel that foods triggered his symptoms.  We did testing that was negative for beef, clam, salmon, scallop, tuna, chicken and egg.  He had a low-grade positive test to lobster 0.5 KU/L and shrimp 0.8 KU/L.  His total IgE is elevated at 727 KU/L.    Physical Exam:  General:  Alert and Oriented.  Eyes:  Sclera clear. Nose: pale boggy mucosal membranes.  Throat:  pink moist, no lesions.  Lungs:  clear to auscultation. Skin: Eczematous patches bilateral forearms extending up to the axilla.    This transcription uses voice recognition software, which may contain typographical errors.

## 2021-06-16 PROBLEM — L29.9 PRURITUS: Status: ACTIVE | Noted: 2017-08-30

## 2021-06-16 PROBLEM — K40.90 INGUINAL HERNIA: Status: ACTIVE | Noted: 2019-03-07

## 2021-06-16 PROBLEM — Z91.018 FOOD ALLERGY: Status: ACTIVE | Noted: 2017-08-30

## 2021-06-18 NOTE — LETTER
Letter by Araceli Turpin CMA at      Author: Araceli Turpin CMA Service: -- Author Type: --    Filed:  Encounter Date: 3/7/2019 Status: (Other)        Maynor Frausto   [address] [place] [address]

## 2021-06-24 NOTE — PROGRESS NOTES
ASSESSMENT AND PLAN:  1. Unilateral inguinal hernia without obstruction or gangrene, recurrence not specified  As a notable inguinal hernia present in the left inguinal region is been present for more than a year it is not decreasing in size it is tender with palpation.  Sexual function urinary function are normal.  He would like to see a general surgeon I have made a referral form  - Ambulatory referral to General Surgery            Orders Placed This Encounter   Procedures     Ambulatory referral to General Surgery     Referral Priority:   Routine     Referral Type:   Surgical     Referral Reason:   Evaluation and Treatment     Requested Specialty:   General Surgery     Number of Visits Requested:   1     There are no discontinued medications.    No Follow-up on file.    CHIEF COMPLAINT:  Chief Complaint   Patient presents with     Testicle Pain     pain, left testicle       HISTORY OF PRESENT ILLNESS:  Maynor is a 23 y.o. male who presents to the clinic today for left groin pain. Maynor is present with a Irish . He has had this for 2-3 years, and this progressively worsened in the last year. There is associated swelling. The patient follows with Dr. Aguero, and has never mentioned this to her. He does not do any heavy lifting or pulling. It is not painful with coughing, sneezing, bowel movements, or urination. There has been no nausea or vomiting. There is no pain with intercourse. Maynor reports decreased swelling and pain with lying down. It becomes more noticeable with getting up. He has not taken anything for the pain.    REVIEW OF SYSTEMS:   : Left groin pain and swelling.  All other systems are negative.    PFSH:  Reviewed as below.     TOBACCO USE:  Social History     Tobacco Use   Smoking Status Never Smoker   Smokeless Tobacco Never Used       VITALS:  Vitals:    02/28/19 1153   BP: 124/64   Pulse: 96   Resp: 12   Temp: 98.4  F (36.9  C)   TempSrc: Oral   SpO2: 98%   Weight: 132 lb 12.8 oz  "(60.2 kg)   Height: 5' 7.75\" (1.721 m)     Wt Readings from Last 3 Encounters:   02/28/19 132 lb 12.8 oz (60.2 kg)   09/05/17 130 lb (59 kg)   08/30/17 128 lb 8 oz (58.3 kg)     Body mass index is 20.34 kg/m .    PHYSICAL EXAM:  General: Alert, cooperative, no distress, appears stated age  HEENT: Normocephalic, without obvious abnormality, atraumatic, moist mucous membranes  Eyes: PERRL, conjunctiva/cornea clear, EOM's intact  : Area of inguinal swelling noted 2 cm superior to the left testicle, measuring 3 x 2 cm, non-pusatile, nonreducible  Neurologic:  A & O x 3.  No tremor, no focal findings.  Normal gait.     DATA REVIEWED:  Additional History from Old Records Summarized (2): Reviewed last physical note with his primary doctor za no abnormalities noted on  exam.  Decision to Obtain Records (1): none  Radiology Tests Summarized or Ordered (1): none  Labs Reviewed or Ordered (1): General IgE test was elevated from September 2017.  He does have elevated IgE level lobster and shrimp  Medicine Test Summarized or Ordered (1): none  Independent Review of EKG or X-RAY(2 each): none    IRaiza, am scribing for and in the presence of, Dr. Milton.    I, Dr. Milton, personally performed the services described in this documentation, as scribed by Raiza Douglas in my presence, and it is both accurate and complete.      MEDICATIONS:  Current Outpatient Medications   Medication Sig Dispense Refill     loratadine (CLARITIN) 10 mg tablet Take 1 tablet (10 mg total) by mouth daily. 30 tablet 11     triamcinolone (KENALOG) 0.1 % ointment Apply topically 2 (two) times a day. 453.6 g 1     No current facility-administered medications for this visit.        Total Data Points: 3    Please note that this clinical encounter uses voice recognition software, there may be typographical errors present   "

## 2021-06-24 NOTE — PROGRESS NOTES
"HPI:  Maynor Frausto is a 23 y.o. male who was referred to me by Juan Aguero MD for an inguinal hernia. He  presents today with complaints of a bulge in his left groin that is been present for 1-2 years.  He notes that it is occasionally tender, particularly when is pushing out.  Denies any symptoms on the right side.    Allergies:Shrimp; Fish containing products; and Penicillins    History reviewed. No pertinent past medical history.    History reviewed. No pertinent surgical history.    CURRENT MEDS:  Current Outpatient Medications   Medication Sig Dispense Refill     loratadine (CLARITIN) 10 mg tablet Take 1 tablet (10 mg total) by mouth daily. 30 tablet 11     triamcinolone (KENALOG) 0.1 % ointment Apply topically 2 (two) times a day. 453.6 g 1     No current facility-administered medications for this visit.        History reviewed. No pertinent family history.     reports that  has never smoked. he has never used smokeless tobacco. He reports that he does not drink alcohol or use drugs.    Review of Systems -   The 10 point review of systems  is within normal limits except for as mentioned above in the HPI.  General ROS: No complaints or constitutional symptoms  Skin: No complaints or symptoms   Hematologic/Lymphatic: No symptoms or complaints  Psychiatric: No symptoms or complaints  Endocrine: No excessive fatigue, no hypermetabolic symptoms reported  Respiratory ROS: no cough, shortness of breath, or wheezing  Cardiovascular ROS: no chest pain or dyspnea on exertion  Gastrointestinal ROS: As per HPI  Musculoskeletal ROS: no recent injuries reported  Neurological ROS: no focal neurologic defects reported.        /70 (Patient Site: Right Arm, Patient Position: Sitting, Cuff Size: Adult Regular)   Pulse 86   Ht 5' 7.75\" (1.721 m)   Wt 132 lb 9.6 oz (60.1 kg)   SpO2 99%   BMI 20.31 kg/m    Body mass index is 20.31 kg/m .    EXAM:  General : Alert, cooperative, appears stated age   Skin: Skin color, " texture, turgor normal, no rashes or lesions   Lymphatic: No obvious adenopathy, no swelling   Eyes: No scleral icterus, pupils equal  HENT: no traumatic injury to the head or face, no gross abnormalities  Lungs: Normal respiratory effort, breath sounds equal bilaterally  Heart: Regular rate and rhythm  Abdomen: Soft, nondistended.  No visible or palpable hernia defect on the right side.  On the left there is a visible protuberance along the external inguinal ring with pulsation present when coughing.  Musculoskeletal: No obvious swelling  Neurologic: Grossly intact      Assessment/Plan:   1. Non-recurrent unilateral inguinal hernia without obstruction or gangrene        Maynor Frausto is a 23 y.o. male with a left inguinal hernia.  I have discussed the pathophysiology of inguinal hernias at length as well as the  surgical and non-operative management strategies.      In particular, the risks and benefits of laparoscopic vs. open inguinal hernia surgery were explained in detail which include, but are not limited to, bleeding, infection of the mesh, recurrence of the hernia, chronic pain, poor cosmesis, the need for reoperative intervention, the possibility of conversion from a laparoscopic approach to an open approach, subcutaneous emphysema, injury to vital structures,  blood clots, heart attack, stroke and death.  Additionally, the risks of observation were also discussed in detail which include, but are not limited to, chronic pain, enlargement of the hernia, incarceration, strangulation and death.      He understands everything that was discussed and has consented to proceed with surgery.   We will plan on scheduling a laparoscopic left inguinal hernia repair at his desired date.       Triston Carrizales MD  [phone]  [facility] Department of Surgery

## 2021-06-24 NOTE — TELEPHONE ENCOUNTER
Triage note:      23 year old male called with concerns about a intermittent pain in his left testicle.      He has had this intermittently for 2-3 years and states that the left testicle hurts and burns when he lifts heavy things. No swelling. No rash or sore.  It hurts for a few seconds and he lays down the pain goes away.  No pain now.     RN triaged to be seen in office today, per his preference.  He is scheduled at 12 pm today with Dr. Milton.      Irene Newby RN, [facility] Refill/Triage, 2/28/2019 9:49 AM    Reason for Disposition    Patient wants to be seen    Protocols used: PENIS AND SCROTUM SYMPTOMS-A-OH

## 2021-06-24 NOTE — PROGRESS NOTES
Hernia education & surgery packet provided to pt.    Abbi Machado CMA ([facility])     Ph: [phone]    Fx: [phone]